# Patient Record
Sex: FEMALE | URBAN - NONMETROPOLITAN AREA
[De-identification: names, ages, dates, MRNs, and addresses within clinical notes are randomized per-mention and may not be internally consistent; named-entity substitution may affect disease eponyms.]

---

## 2021-08-02 ENCOUNTER — OFFICE VISIT (OUTPATIENT)
Dept: URGENT CARE | Facility: PHYSICIAN GROUP | Age: 66
End: 2021-08-02
Payer: COMMERCIAL

## 2021-08-02 VITALS
OXYGEN SATURATION: 92 % | TEMPERATURE: 97.2 F | RESPIRATION RATE: 16 BRPM | HEIGHT: 64 IN | WEIGHT: 170 LBS | BODY MASS INDEX: 29.02 KG/M2 | HEART RATE: 77 BPM

## 2021-08-02 DIAGNOSIS — B37.31 CANDIDAL VULVOVAGINITIS: ICD-10-CM

## 2021-08-02 PROCEDURE — 99203 OFFICE O/P NEW LOW 30 MIN: CPT | Performed by: PHYSICIAN ASSISTANT

## 2021-08-02 RX ORDER — FLUCONAZOLE 150 MG/1
TABLET ORAL
Qty: 3 TABLET | Refills: 0 | Status: SHIPPED | OUTPATIENT
Start: 2021-08-02

## 2021-08-02 NOTE — PATIENT INSTRUCTIONS
Candidiasis vaginal en los adultos  Vaginal Yeast infection, Adult    La infección micótica vaginal es anne afección que causa secreción vaginal y también dolor, hinchazón y enrojecimiento (inflamación) de la vagina. Esta es anne enfermedad frecuente. Algunas mujeres contraen esta infección con frecuencia.  ¿Cuáles son las causas?  La causa de la infección es un cambio en el equilibrio normal de los hongos (cándida) y las bacterias que viven en la vagina. Esta alteración deriva en el crecimiento excesivo de los hongos, lo que causa la inflamación.  ¿Qué incrementa el riesgo?  La afección es más probable en las mujeres que tienen estas características:  · Racquel antibióticos.  · Tienen diabetes.  · Racquel anticonceptivos orales.  · Están embarazadas.  · Se hacen duchas vaginales con frecuencia.  · Tienen debilitado el sistema de defensa del organismo (sistema inmunitario).  · Laws estado tomando medicamentos con corticoesteroides cruz mucho tiempo.  · Usan ropa ajustada con frecuencia.  ¿Cuáles son los signos o los síntomas?  Los síntomas de esta afección incluyen los siguientes:  · Secreción vaginal kristy, cremosa y espesa.  · Hinchazón, picazón, enrojecimiento e irritación de la vagina. Los labios de la vagina (vulva) también se pueden infectar.  · Dolor o ardor al orinar.  · Dolor cruz las relaciones sexuales.  ¿Cómo se diagnostica?  Esta afección se diagnostica en función de lo siguiente:  · Laxmi antecedentes médicos.  · Un examen físico.  · Examen pélvico. El médico examinará anne muestra de la secreción vaginal con un microscopio. Probablemente el médico envíe esta muestra al laboratorio para analizarla y confirmar el diagnóstico.  ¿Cómo se trata?  Esta afección se trata con medicamentos. Los medicamentos pueden ser recetados o de venta jemal. Podrán indicarle que use leilani o más de lo siguiente:  · Medicamentos que se racquel por boca (orales).  · Medicamentos que se aplican yoan anne crema  (tópicos).  · Medicamentos que se colocan directamente en la vagina (óvulos vaginales).  Siga estas indicaciones en holm casa:    Estilo de carrillo  · No tenga relaciones sexuales hasta que el médico lo autorice. Comunique a holm compañero sexual que tiene anne infección por hongos. Murtaza persona debería visitar a holm médico y preguntarle si debería recibir tratamiento también.  · No use ropa ajustada, yoan pantimedias o pantalones ajustados.  · Use ropa interior de algodón, que permite el paso del aire.  Indicaciones generales  · Kiana o aplíquese los medicamentos de venta jemal y los recetados solamente yoan se lo haya indicado el médico.  · Consuma más yogur. Aldan puede ayudar a evitar la recurrencia de la candidiasis.  · No use tampones hasta que el médico la autorice.  · Intente darse un baño de asiento para aliviar las molestias. Se trata de un baño de agua tibia que se jluis mientras se está sentado. El agua solo debe llegar hasta las caderas y cubrir las nalgas. Hágalo 3 o 4 veces al día o yoan se lo haya indicado el médico.  · No se chuy duchas vaginales.  · Si tiene diabetes, mantenga bajo control los niveles de azúcar en la nael.  · Concurra a todas las visitas de control yoan se lo haya indicado el médico. Aldan es importante.  Comuníquese con un médico si:  · Tiene fiebre.  · Los síntomas desaparecen y luego vuelven a aparecer.  · Los síntomas no mejoran con el tratamiento.  · Laxmi síntomas empeoran.  · Aparecen nuevos síntomas.  · Aparecen ampollas alrededor o adentro de la vagina.  · Le sale nael de la vagina y no está menstruando.  · Siente dolor en el abdomen.  Resumen  · La infección micótica vaginal es anne afección que causa secreción y también dolor, hinchazón y enrojecimiento (inflamación) de la vagina.  · Esta afección se trata con medicamentos. Los medicamentos pueden ser recetados o de venta jemal.  · Kiana o aplíquese los medicamentos de venta jemal y los recetados solamente yoan se lo haya indicado el  médico.  · No se chuy duchas vaginales. No tenga relaciones sexuales ni use tampones hasta que el médico la autorice.  · Comuníquese con un médico si los síntomas no mejoran con el tratamiento o si los síntomas desaparecen y luego regresan.  Esta información no tiene yoan fin reemplazar el consejo del médico. Asegúrese de hacerle al médico cualquier pregunta que tenga.  Document Released: 09/27/2006 Document Revised: 06/27/2019 Document Reviewed: 06/27/2019  Elsevier Patient Education © 2020 [employer]

## 2021-08-02 NOTE — PROGRESS NOTES
"Subjective:   Jael Butler  is a 65 y.o. female who presents for Vaginal Itching (x 1 week )    Patient identity confirmed using two patient identifiers of last name and date of birth.    Patient here with her  who provides translation. Offered language line :  will translate.       Vaginal Itching    Patient presents urgent care with rash, itching and burning of the vaginal area as well as upper inner thighs and buttock region present for the past 1 week, worsening.  Patient has been using over-the-counter vaginocele, hemorrhoid cream and miconazole with no relief.  Patient reports that the miconazole actually seems to make this burn more.  Patient has not recently been on any antibiotics for any reason.  She denies history of diabetes and her  reports that she did just have labs performed with her primary care provider and was told everything looked fine.  Review of Systems   Genitourinary: Negative for dysuria, frequency and urgency.        Vaginal itching/burning, no discharge   Skin:        Redness, itching and burning vaginal area, upper inner legs and buttock   All other systems reviewed and are negative.    No Known Allergies  Reviewed past medical, surgical , social and family history.  Reviewed prescription and over-the-counter medications with patient and electronic health record today.     Objective:   Pulse 77   Temp 36.2 °C (97.2 °F) (Temporal)   Resp 16   Ht 1.626 m (5' 4\")   Wt 77.1 kg (170 lb)   SpO2 92%   BMI 29.18 kg/m²   Physical Exam  Vitals reviewed.   Constitutional:       Appearance: She is well-developed.   HENT:      Head: Normocephalic and atraumatic.      Right Ear: External ear normal.      Left Ear: External ear normal.   Eyes:      Extraocular Movements: Extraocular movements intact.      Conjunctiva/sclera: Conjunctivae normal.      Pupils: Pupils are equal, round, and reactive to light.   Cardiovascular:      Rate and Rhythm: Normal " rate and regular rhythm.      Heart sounds: Normal heart sounds.   Pulmonary:      Effort: Pulmonary effort is normal.      Breath sounds: Normal breath sounds.   Genitourinary:      Musculoskeletal:         General: Normal range of motion.      Cervical back: Normal range of motion and neck supple.   Lymphadenopathy:      Cervical: No cervical adenopathy.   Skin:     General: Skin is warm and dry.      Findings: No rash.             Comments: Bright red macular/papular rash with satellite lesions   Neurological:      Mental Status: She is alert and oriented to person, place, and time.      Cranial Nerves: Cranial nerves are intact.      Sensory: Sensation is intact.      Motor: Motor function is intact.      Coordination: Coordination is intact.   Psychiatric:         Attention and Perception: Attention normal.         Mood and Affect: Mood normal.         Speech: Speech normal.         Behavior: Behavior normal.         Thought Content: Thought content normal.         Judgment: Judgment normal.           Assessment/Plan:   1. Candidal vulvovaginitis  - fluconazole (DIFLUCAN) 150 MG tablet; Take 1 tab po qd q 72 hours for 3 total doses.  Dispense: 3 tablet; Refill: 0      Patient is experiencing a rather significant yeast infection.  I am concerned that 1 dose of fluconazole will not be sufficient.  She is therefore treated for a total of 3 doses as noted above.  Encourage open to air when possible.  Discontinue use of cream which is causing her discomfort.    Differential diagnosis, natural history, supportive care, and indications for immediate follow-up discussed.     Red flag warning symptoms and strict ER/follow-up precautions given.  The patient demonstrated a good understanding and agreed with the treatment plan.    Upon entering exam room I ensured patient was wearing a mask.  This provider wore appropriate PPE throughout entire visit.  Patient wore mask entire visit except for a brief period while  examining oropharynx.    Please note that this note was created using voice recognition speech to text software. Every effort has been made to correct obvious errors.  However, I expect there are errors of grammar and possibly context that were not discovered prior to finalizing the note  SIXTO Swartz PA-C

## 2021-08-25 ENCOUNTER — OFFICE VISIT (OUTPATIENT)
Dept: URGENT CARE | Facility: PHYSICIAN GROUP | Age: 66
End: 2021-08-25
Payer: COMMERCIAL

## 2021-08-25 VITALS
SYSTOLIC BLOOD PRESSURE: 110 MMHG | WEIGHT: 176 LBS | HEART RATE: 92 BPM | HEIGHT: 64 IN | BODY MASS INDEX: 30.05 KG/M2 | RESPIRATION RATE: 16 BRPM | TEMPERATURE: 98.2 F | OXYGEN SATURATION: 97 % | DIASTOLIC BLOOD PRESSURE: 68 MMHG

## 2021-08-25 DIAGNOSIS — T78.40XA ALLERGIC RASH PRESENT ON EXAMINATION: ICD-10-CM

## 2021-08-25 PROCEDURE — 99213 OFFICE O/P EST LOW 20 MIN: CPT | Performed by: PHYSICIAN ASSISTANT

## 2021-08-25 RX ORDER — TRIAMCINOLONE ACETONIDE 0.25 MG/G
1 CREAM TOPICAL 2 TIMES DAILY
Qty: 80 G | Refills: 0 | Status: SHIPPED | OUTPATIENT
Start: 2021-08-25

## 2021-08-25 RX ORDER — PREDNISONE 10 MG/1
20 TABLET ORAL DAILY
Qty: 6 TABLET | Refills: 0 | Status: SHIPPED | OUTPATIENT
Start: 2021-08-25 | End: 2021-08-28

## 2021-08-25 ASSESSMENT — ENCOUNTER SYMPTOMS
SHORTNESS OF BREATH: 0
COUGH: 0
CHILLS: 0
NAUSEA: 0
CONSTIPATION: 0
EYE PAIN: 0
ABDOMINAL PAIN: 0
SORE THROAT: 0
MYALGIAS: 0
HEADACHES: 0
FEVER: 0
DIARRHEA: 0
VOMITING: 0

## 2021-08-25 NOTE — PROGRESS NOTES
"Subjective:   Jael Butler is a 65 y.o. female who presents for Facial Swelling (mouth, tongue an lips started saturday night) and Foot Swelling (both feet on monday started to swell)      HPI:  Is a 65-year-old female who presents noticing lip, tongue and mouth swelling and itching that started around 4 nights ago and then it spontaneously resolved.  Over the last 48 hours she has noticed pruritus, swelling, redness and irritation of bilateral feet.  She notes that she did get new shoes over the weekend.  Does not recall any other new detergents, soaps, perfumes etc.  No fevers or chills.  No history of same.    Review of Systems   Constitutional: Negative for chills and fever.   HENT: Negative for congestion, ear pain and sore throat.    Eyes: Negative for pain.   Respiratory: Negative for cough and shortness of breath.    Cardiovascular: Positive for leg swelling. Negative for chest pain.   Gastrointestinal: Negative for abdominal pain, constipation, diarrhea, nausea and vomiting.   Genitourinary: Negative for dysuria.   Musculoskeletal: Negative for myalgias.   Skin: Positive for itching and rash.   Neurological: Negative for headaches.       Medications:    • fluconazole  • predniSONE Tabs  • triamcinolone acetonide Crea    Allergies: Patient has no known allergies.    Problem List: Jael Butler does not have a problem list on file.    Surgical History:  No past surgical history on file.    Past Social Hx: Jael Butler  reports that she has never smoked. She has never used smokeless tobacco. She reports that she does not drink alcohol and does not use drugs.     Past Family Hx:  Jael Butler family history is not on file.     Problem list, medications, and allergies reviewed by myself today in Epic.     Objective:     /68   Pulse 92   Temp 36.8 °C (98.2 °F) (Temporal)   Resp 16   Ht 1.626 m (5' 4\")   Wt 79.8 kg (176 lb)   SpO2 97%   BMI 30.21 kg/m² "     Physical Exam  Vitals reviewed.   Constitutional:       Appearance: Normal appearance.   HENT:      Head: Normocephalic and atraumatic.      Right Ear: External ear normal.      Left Ear: External ear normal.      Nose: Nose normal.      Mouth/Throat:      Mouth: Mucous membranes are moist.      Comments: Swelling of the vermilion border, glossitis, perioral lesions  Eyes:      Conjunctiva/sclera: Conjunctivae normal.   Cardiovascular:      Rate and Rhythm: Normal rate.   Pulmonary:      Effort: Pulmonary effort is normal.   Skin:     General: Skin is warm and dry.      Capillary Refill: Capillary refill takes less than 2 seconds.      Comments: On the plantar aspect of bilateral legs extending over the top of the foot there is areas of patchy erythema with warmth.  Mild excoriation on the right.   Neurological:      Mental Status: She is alert and oriented to person, place, and time.         Assessment/Plan:     Diagnosis and associated orders:     1. Allergic rash present on examination  predniSONE (DELTASONE) 10 MG Tab    triamcinolone acetonide (KENALOG) 0.025 % Cream      Comments/MDM:     • Trial of prednisone, triamcinolone cream.  Start over-the-counter Benadryl.  Hunt for possible allergens there exposure, especially given new footwear and then development of this rash on the feet.  Return as needed.         Differential diagnosis, natural history, supportive care, and indications for immediate follow-up discussed.    Advised the patient to follow-up with the primary care physician for recheck, reevaluation, and consideration of further management.    Please note that this dictation was created using voice recognition software. I have made a reasonable attempt to correct obvious errors, but I expect that there are errors of grammar and possibly content that I did not discover before finalizing the note.    This note was electronically signed by Kalin Clifton PA-C